# Patient Record
Sex: FEMALE | Race: WHITE | URBAN - METROPOLITAN AREA
[De-identification: names, ages, dates, MRNs, and addresses within clinical notes are randomized per-mention and may not be internally consistent; named-entity substitution may affect disease eponyms.]

---

## 2019-03-05 PROCEDURE — 87077 CULTURE AEROBIC IDENTIFY: CPT | Performed by: EMERGENCY MEDICINE

## 2019-03-05 PROCEDURE — 87070 CULTURE OTHR SPECIMN AEROBIC: CPT | Performed by: EMERGENCY MEDICINE

## 2019-03-05 PROCEDURE — 87205 SMEAR GRAM STAIN: CPT | Performed by: EMERGENCY MEDICINE

## 2021-10-25 ENCOUNTER — HOSPITAL ENCOUNTER (INPATIENT)
Facility: HOSPITAL | Age: 31
LOS: 2 days | Discharge: HOME | DRG: 638 | End: 2021-10-27
Attending: EMERGENCY MEDICINE | Admitting: INTERNAL MEDICINE
Payer: COMMERCIAL

## 2021-10-25 ENCOUNTER — APPOINTMENT (OUTPATIENT)
Dept: GENERAL RADIOLOGY | Facility: HOSPITAL | Age: 31
DRG: 638 | End: 2021-10-25
Attending: EMERGENCY MEDICINE
Payer: COMMERCIAL

## 2021-10-25 DIAGNOSIS — E10.10 DIABETIC KETOACIDOSIS WITHOUT COMA ASSOCIATED WITH TYPE 1 DIABETES MELLITUS (HCC): Primary | ICD-10-CM

## 2021-10-25 DIAGNOSIS — R73.9 HYPERGLYCEMIA: ICD-10-CM

## 2021-10-25 DIAGNOSIS — N17.9 ACUTE KIDNEY INJURY (HCC): ICD-10-CM

## 2021-10-25 DIAGNOSIS — E87.2 METABOLIC ACIDOSIS: ICD-10-CM

## 2021-10-25 PROBLEM — E87.20 METABOLIC ACIDOSIS: Status: ACTIVE | Noted: 2021-10-25

## 2021-10-25 PROBLEM — D72.829 LEUKOCYTOSIS: Status: ACTIVE | Noted: 2021-10-25

## 2021-10-25 PROCEDURE — 3046F HEMOGLOBIN A1C LEVEL >9.0%: CPT | Performed by: NURSE PRACTITIONER

## 2021-10-25 PROCEDURE — 99291 CRITICAL CARE FIRST HOUR: CPT | Performed by: INTERNAL MEDICINE

## 2021-10-25 PROCEDURE — 71045 X-RAY EXAM CHEST 1 VIEW: CPT | Performed by: EMERGENCY MEDICINE

## 2021-10-25 PROCEDURE — 99254 IP/OBS CNSLTJ NEW/EST MOD 60: CPT | Performed by: INTERNAL MEDICINE

## 2021-10-25 RX ORDER — LEVOTHYROXINE SODIUM 0.07 MG/1
75 TABLET ORAL
Status: DISCONTINUED | OUTPATIENT
Start: 2021-10-25 | End: 2021-10-27

## 2021-10-25 RX ORDER — ONDANSETRON 2 MG/ML
4 INJECTION INTRAMUSCULAR; INTRAVENOUS EVERY 4 HOURS PRN
Status: DISCONTINUED | OUTPATIENT
Start: 2021-10-25 | End: 2021-10-25

## 2021-10-25 RX ORDER — MELATONIN
3 NIGHTLY PRN
Status: DISCONTINUED | OUTPATIENT
Start: 2021-10-25 | End: 2021-10-27

## 2021-10-25 RX ORDER — SODIUM CHLORIDE 9 MG/ML
INJECTION, SOLUTION INTRAVENOUS CONTINUOUS
Status: DISCONTINUED | OUTPATIENT
Start: 2021-10-25 | End: 2021-10-25

## 2021-10-25 RX ORDER — SODIUM CHLORIDE 450 MG/100ML
INJECTION, SOLUTION INTRAVENOUS CONTINUOUS
Status: DISCONTINUED | OUTPATIENT
Start: 2021-10-25 | End: 2021-10-25

## 2021-10-25 RX ORDER — ENOXAPARIN SODIUM 100 MG/ML
40 INJECTION SUBCUTANEOUS NIGHTLY
Status: DISCONTINUED | OUTPATIENT
Start: 2021-10-25 | End: 2021-10-25 | Stop reason: DRUGHIGH

## 2021-10-25 RX ORDER — INSULIN ASPART 100 [IU]/ML
INJECTION, SOLUTION INTRAVENOUS; SUBCUTANEOUS CONTINUOUS
COMMUNITY

## 2021-10-25 RX ORDER — HYDROMORPHONE HYDROCHLORIDE 1 MG/ML
0.5 INJECTION, SOLUTION INTRAMUSCULAR; INTRAVENOUS; SUBCUTANEOUS EVERY 30 MIN PRN
Status: ACTIVE | OUTPATIENT
Start: 2021-10-25 | End: 2021-10-25

## 2021-10-25 RX ORDER — ENOXAPARIN SODIUM 100 MG/ML
30 INJECTION SUBCUTANEOUS NIGHTLY
Status: DISCONTINUED | OUTPATIENT
Start: 2021-10-25 | End: 2021-10-25

## 2021-10-25 RX ORDER — DEXTROSE AND SODIUM CHLORIDE 5; .45 G/100ML; G/100ML
INJECTION, SOLUTION INTRAVENOUS CONTINUOUS
Status: DISCONTINUED | OUTPATIENT
Start: 2021-10-25 | End: 2021-10-25

## 2021-10-25 RX ORDER — ACETAMINOPHEN 325 MG/1
650 TABLET ORAL EVERY 6 HOURS PRN
Status: DISCONTINUED | OUTPATIENT
Start: 2021-10-25 | End: 2021-10-27

## 2021-10-25 RX ORDER — DEXTROSE MONOHYDRATE 25 G/50ML
50 INJECTION, SOLUTION INTRAVENOUS
Status: DISCONTINUED | OUTPATIENT
Start: 2021-10-25 | End: 2021-10-25

## 2021-10-25 RX ORDER — INSULIN ASPART 100 [IU]/ML
0.2 INJECTION, SOLUTION INTRAVENOUS; SUBCUTANEOUS ONCE
Status: COMPLETED | OUTPATIENT
Start: 2021-10-25 | End: 2021-10-25

## 2021-10-25 RX ORDER — SODIUM BICARBONATE 150 MEQ/1,000 ML IN DEXTROSE 5 % INTRAVENOUS
125 SOLUTION CONTINUOUS
Status: DISCONTINUED | OUTPATIENT
Start: 2021-10-25 | End: 2021-10-25

## 2021-10-25 RX ORDER — ENOXAPARIN SODIUM 100 MG/ML
40 INJECTION SUBCUTANEOUS DAILY
Status: DISCONTINUED | OUTPATIENT
Start: 2021-10-26 | End: 2021-10-27

## 2021-10-25 RX ORDER — PROCHLORPERAZINE EDISYLATE 5 MG/ML
5 INJECTION INTRAMUSCULAR; INTRAVENOUS EVERY 8 HOURS PRN
Status: DISCONTINUED | OUTPATIENT
Start: 2021-10-25 | End: 2021-10-27

## 2021-10-25 RX ORDER — DEXTROSE MONOHYDRATE 25 G/50ML
50 INJECTION, SOLUTION INTRAVENOUS
Status: DISCONTINUED | OUTPATIENT
Start: 2021-10-25 | End: 2021-10-27

## 2021-10-25 RX ORDER — LEVOTHYROXINE SODIUM 0.07 MG/1
75 TABLET ORAL
COMMUNITY

## 2021-10-25 RX ORDER — ONDANSETRON 2 MG/ML
4 INJECTION INTRAMUSCULAR; INTRAVENOUS EVERY 6 HOURS PRN
Status: DISCONTINUED | OUTPATIENT
Start: 2021-10-25 | End: 2021-10-27

## 2021-10-25 NOTE — ED INITIAL ASSESSMENT (HPI)
Patient here with c/o emesis since Saturday, patient recently got back from [place] Saturday. Patient vomited 2 times this AM and started feeling SOB PTA. Patient is type 1 DM, reports she cant get her sugar under control.

## 2021-10-25 NOTE — ED PROVIDER NOTES
Patient Seen in: [facility] Emergency Department      History   Patient presents with:  Nausea/Vomiting/Diarrhea  Difficulty Breathing    Stated Complaint: pt states she has been vomiting for 4 hours with sob and left upper quad pain, *    Jay with kusmal breathing noted. She is toxic appearing. Somewhat sleepy. HEENT: Pupils are equal reactive to light. Extra ocular motions are intact. No scleral icterus or conjunctival pallor: Neck is supple without tenderness on palpation.   Head is atrau Please view results for these tests on the individual orders.    URINALYSIS WITH CULTURE REFLEX   BETA HYDROXYBUTYRATE   RAINBOW DRAW LAVENDER   RAINBOW DRAW LIGHT GREEN   RAINBOW DRAW GOLD     EKG    Rate, intervals and axes as noted on EKG Repor admission because of her diabetic ketoacidosis. She will be given IV fluids as well as insulin.   A total of 30 minutes of critical care time (exclusive of billable procedures) was administered to manage the patient's metabolic instability due to her diabe

## 2021-10-25 NOTE — CONSULTS
[facility]  Report of Consultation    Casandra Hdz Patient Status:  Inpatient    10/14/1990 MRN [id number]   [facility] 4SW-A Attending Padmini Riggs MD   Hosp Day # 0 PCP Trinity Retana MD       Assessment / Plan:    1) FAROOQ on CKD- Intravenous, Continuous  •  levothyroxine tab 75 mcg, 75 mcg, Oral, Before breakfast  •  glucose (DEX4) oral liquid 15 g, 15 g, Oral, Q15 Min PRN **OR** glucose-vitamin C (DEX-4) chewable tab 4 tablet, 4 tablet, Oral, Q15 Min PRN **OR** dextrose 50 % injec Abdomen: Soft, non-tender. + bowel sounds, no palpable organomegaly  Extremities: Without clubbing, cyanosis; no edema  Neurologic: Cranial nerves grossly intact, moving all extremities  Skin: Warm and dry, no rashes      Laboratory Data:  Lab Results

## 2021-10-25 NOTE — CONSULTS
[facility] Note  [facility]  Report of Consultation    Lexx Schmitt Patient Status:  Emergency    10/14/1990 MRN [id number]   Location [address] [place] [address] Attending Deronda Fabry, MD fatigue, orthopnea, paroxysmal nocturnal dyspnea, lower extremity edema. Gastrointestinal: nausea and vomiting and diarrhea. Integument/breast: Negative for rash, skin lesions, and pruritus.   Hematologic/lymphatic: Negative for easy bruising, bleeding, a 4.99   HGB 13.5   HCT 45.8   MCV 91.8   MCH 27.1   MCHC 29.5*   RDW 12.7   NEPRELIM 20.73*   WBC 24.3*   .0*     No results for input(s): BNP in the last 168 hours.   Recent Labs   Lab 10/25/21  0745   TROP <0.045     No results for input(s): PT, INR

## 2021-10-25 NOTE — PLAN OF CARE
Received report from ED RN. Pt arrived to unit via stretcher with IVFs infusing at 125 ml/hr. No s/s of acute distress noted at this time. Pts HR noted to be sustained in the 140's, APN and MD notified, and a 1 L NS bolus hung for infusion.

## 2021-10-25 NOTE — H&P
[facility] HOSPITALIST                                                               History & Physical         Yovani Mcrae Patient Status:  Inpatient    10/14/1990 MRN [id number]   [facility] 4SW-A Attending Emerita Le MD   [facility] Day # 0 PC Medical History:   Diagnosis Date   • Diabetic retinopathy ([facility]    • Hypothyroidism    • Thyroid disease    • Type 1 diabetes mellitus ([facility]        Past Surgical History:   Procedure Laterality Date   • CATARACT         Family history:  Family History   P Component Value Date    WBC 24.3 10/25/2021    HGB 13.5 10/25/2021    HCT 45.8 10/25/2021    .0 10/25/2021    CREATSERUM 2.72 10/25/2021    BUN 42 10/25/2021     10/25/2021    K 4.8 10/25/2021     10/25/2021    CO2 5.0 10/25/2021    GL normal saline once blood sugar less than 120 if anion gap persist at that time. 3. Acute kidney injury due to intractable nausea vomiting due to DKA  1. IV fluids  2. Nephrology consult  4.  Leukocytosis and thrombocytosis due to stress-induced leukocytosi

## 2021-10-25 NOTE — CONSULTS
[facility] Pharmacy Note:  Renal Dose Adjustment for enoxaparin (LOVENOX)    Jazmin Chacko has been prescribed enoxaparin 40 mg subcutaneously every 24 hours. Estimated Creatinine Clearance: 28.1 mL/min (A) (based on SCr of 2.72 mg/dL (H)).     Calculated CrCl 20

## 2021-10-26 PROCEDURE — 99232 SBSQ HOSP IP/OBS MODERATE 35: CPT | Performed by: INTERNAL MEDICINE

## 2021-10-26 PROCEDURE — 99291 CRITICAL CARE FIRST HOUR: CPT | Performed by: INTERNAL MEDICINE

## 2021-10-26 RX ORDER — POTASSIUM CHLORIDE 20 MEQ/1
40 TABLET, EXTENDED RELEASE ORAL ONCE
Status: DISCONTINUED | OUTPATIENT
Start: 2021-10-26 | End: 2021-10-26

## 2021-10-26 RX ORDER — DEXTROSE AND POTASSIUM CHLORIDE 5; .15 G/100ML; G/100ML
SOLUTION INTRAVENOUS CONTINUOUS
Status: DISCONTINUED | OUTPATIENT
Start: 2021-10-26 | End: 2021-10-27

## 2021-10-26 RX ORDER — POTASSIUM CHLORIDE 1.5 G/1.77G
40 POWDER, FOR SOLUTION ORAL ONCE
Status: COMPLETED | OUTPATIENT
Start: 2021-10-26 | End: 2021-10-26

## 2021-10-26 NOTE — PROGRESS NOTES
Pharmacy Note: Renal dose adjustment   Preston Dillon was originally prescribed enoxaparin 40 mg subcutaneously every 24 hours which was renally dose adjusted at the time of the original order per P&T approved renal dosing protocol.   Renal function has now

## 2021-10-26 NOTE — PLAN OF CARE
Assumed pt care at 0730. Pt in bed resting quietly on room air. No s/s of acute distress noted at this time. VSS. Pt has an insulin gtt infusing for DKA, with hourly accuchecks. Spouse at the bedside. Pt denies any pain at this time. A&Ox4.  Tra

## 2021-10-26 NOTE — PROGRESS NOTES
[facility] Pulmonary/Critical Care Progress Note     SUBJECTIVE/Interval history: All events, procedures, notes reviewed. pt denies cp or sob or abdominal pain or n/v      Review of Systems:   A comprehensive 14 point re GFRNAA 39* 42*  42* 43*   CA 8.8 9.0  9.0 8.9   * 157*  157* 157*   K 3.9 3.8  3.8 3.8   * 133*  133* 133*   CO2 14.0* 15.0*  15.0* 15.0*     Recent Labs   Lab 10/25/21  0745 10/26/21  0504   RBC 4.99 4.11   HGB 13.5 10.8*   HCT 45.8 34.5* · continue iv insulin ggt for DKA. · nagma now. May start clear liquids and advance as tolerated.  May give subcut insulin and stop insulin ggt an hour later   · Remove insulin pump  · Proph: hep subcut  · D/w rn/apn  · cctime 35 min  · Dispo:ICU for dk

## 2021-10-26 NOTE — PAYOR COMM NOTE
--------------  ADMISSION REVIEW     Payor: Sai WOODARD #:  [id number]  Authorization Number: [id number]    Admit date: 10/25/21  Admit time: 10:11 AM       REVIEW DOCUMENTATION:     ED Provider Notes      ED Provider Notes signed by Jemma Zuleta negative except as noted above.     Physical Exam     ED Triage Vitals [10/25/21 0711]   /75   Pulse 61   Resp (!) 30   Temp 97.3 °F (36.3 °C)   Temp src Temporal   SpO2 97 %   O2 Device        Current:/82   Pulse (!) 144   Temp 97.3 °F (36.3 °C limits   TROPONIN I - Normal   RAPID SARS-COV-2 BY PCR - Normal   CBC WITH DIFFERENTIAL WITH PLATELET    Narrative: The following orders were created for panel order CBC With Differential With Platelet.   Procedure                               Abnormal 5 anion gap of 25 BUN of 42 creatinine of 2.7. Calcium of 10.3 total protein 9.7 globulin 5.1. Troponin was normal.  Chest x-ray did not show any evidence of any infiltrative process to assess for pneumonia.   Admission disposition: 10/25/2021  9:16 AM Laurence David MD (Physician)         [facility] [place] [facility]                                                               History & Physical         Mya Yale Patient Status:  Inpatient    10/14/1990 MRN [id number]   [facility] 4SW-A Attendi physician on consult.     History:  Past Medical History:   Diagnosis Date   • Diabetic retinopathy ([facility]    • Hypothyroidism    • Thyroid disease    • Type 1 diabetes mellitus ([facility]        Past Surgical History:   Procedure Laterality Date   • CATARACT Results   Component Value Date    WBC 24.3 10/25/2021    HGB 13.5 10/25/2021    HCT 45.8 10/25/2021    .0 10/25/2021    CREATSERUM 2.72 10/25/2021    BUN 42 10/25/2021     10/25/2021    K 4.8 10/25/2021     10/25/2021    CO2 5.0 10/25/20 D5 normal saline once blood sugar less than 120 if anion gap persist at that time. 3. Acute kidney injury due to intractable nausea vomiting due to DKA  1. IV fluids  2. Nephrology consult  4.  Leukocytosis and thrombocytosis due to stress-induced leukocyt Dextrose-NaCl 5-0.45 % 1,000 mL with potassium phosphate dibasic 20 mmol infusion     Date Action Dose Route User    10/26/2021 0454 New Bag (none) Intravenous Julieth Peñaloza RN    10/25/2021 0133 Rate/Dose Change (none) Intravenous Nora Tripp, — 109 16 102/72 100 % — — — LZ    10/26/21 0000 99.7 °F (37.6 °C) 103 16 114/72 100 % — None (Room air) —     10/25/21 2300 — 117 17 117/68 100 % — — —     10/25/21 2200 — 123 22 — 100 % — — —     10/25/21 2100 — 113 14 113/59 100 % — — —     10/25 PLAN- fluid resuscitation / mgmt of DKA; expect recovery. Obtain records from [facility]     2) DKA- may be related to acute illness + recent changes in insulin pump; now presents with severe AGMA due to ketosis.  PLAN- continue IVF / insulin gt; follow lytes (

## 2021-10-26 NOTE — PROGRESS NOTES
[facility]  Progress Note    Preston Dillon Patient Status:  Inpatient    10/14/1990 MRN [id number]   [facility] 4SW-A Attending Jose Avendaño MD   Hosp Day # 1 PCP Judy Escamilla MD     CC: Abdominal pain, nausea vomiting, DKA    SUBJ non-tender; bowel sounds normal  Extremities: extremities normal,no cyanosis or edema  Pulses: 2+ and symmetric  Skin: Skin color, texture, turgor normal. No rashes or lesions  Neurologic: Awake,alert,nonfocal  Psych: Mood and affect appears normal      La tab 3 mg, 3 mg, Oral, Nightly PRN  ondansetron (ZOFRAN) injection 4 mg, 4 mg, Intravenous, Q6H PRN  Prochlorperazine Edisylate (COMPAZINE) injection 5 mg, 5 mg, Intravenous, Q8H PRN  enoxaparin (LOVENOX) 40 MG/0.4ML injection 40 mg, 40 mg, Subcutaneous, Da nephrology  ·  Repeat BMP shows improving sodium and chloride levels  ·  Bicarb improving        Quality:  · DVT Prophylaxis: SCD, subcutaneous Lovenox  · CODE status: Full  · Harmon: No     Plan of care :  Insulin drip being switched to subcutaneous Levemir

## 2021-10-26 NOTE — PROGRESS NOTES
[facility]  Nephrology Progress Note    Marcelle Gonzalez Attending:  Sampson Disla MD       Assessment and Plan:    1) FAROOQ on CKD- baseline renal function unknown but given h/o proteinuria + retinopathy and 20+ yr h/o type 1 DM- likely has underlying diab 3.8 10/26/2021     10/26/2021    CO2 15.0 10/26/2021     10/26/2021    CA 8.9 10/26/2021    ALB 3.2 10/26/2021    ALKPHO 59 10/26/2021    BILT 0.4 10/26/2021    TP 6.9 10/26/2021    AST 11 10/26/2021    ALT 18 10/26/2021    TSH 0.396 10/25/202

## 2021-10-27 VITALS
SYSTOLIC BLOOD PRESSURE: 138 MMHG | RESPIRATION RATE: 15 BRPM | OXYGEN SATURATION: 100 % | WEIGHT: 153 LBS | HEIGHT: 66 IN | HEART RATE: 85 BPM | BODY MASS INDEX: 24.59 KG/M2 | DIASTOLIC BLOOD PRESSURE: 85 MMHG | TEMPERATURE: 99 F

## 2021-10-27 PROCEDURE — 3061F NEG MICROALBUMINURIA REV: CPT | Performed by: NURSE PRACTITIONER

## 2021-10-27 PROCEDURE — 99239 HOSP IP/OBS DSCHRG MGMT >30: CPT | Performed by: INTERNAL MEDICINE

## 2021-10-27 PROCEDURE — 3060F POS MICROALBUMINURIA REV: CPT | Performed by: NURSE PRACTITIONER

## 2021-10-27 PROCEDURE — 99233 SBSQ HOSP IP/OBS HIGH 50: CPT | Performed by: INTERNAL MEDICINE

## 2021-10-27 NOTE — DISCHARGE SUMMARY
[facility]  DISCHARGE SUMMARY     Milton Gardiner Patient Status:  Inpatient    10/14/1990 MRN [id number]   [facility] 4SW-A Attending No att. providers found   Hosp Day # 2 PCP Rachele Vogel DO     Date of Admission: 10/25/2021  Date being given from the ER and admitted to ICU with pulmonary critical care physician on consult. Brief Synopsis: presented with n/v and found to be in DKA with FAROOQ. Admitted to ICU on insulin drip and IVF. Pulm/CC and nephrology consulted.  She was treated 1/INITIAL  [1918] 60 min Jessy, [address], [place] Blanquita Briones, [place], CDE    Patient Instructions:     WHO TO CONTACT TO CHANGE APPOINTMENT   If you need to make changes to your appointment or have questions,  please ca focal neurological deficits. Musculoskeletal: Moves all extremities. Extremities: No edema.   -----------------------------------------------------------------------------------------------  PATIENT DISCHARGE INSTRUCTIONS: See electronic chart    Marychuy Hurd

## 2021-10-27 NOTE — PLAN OF CARE
PIV removed. Pt deferred flu vaccine at this time. Education provided. Reviewed discharge papers with pt and spouse. Questions answered. Pt accompanied down to lobby with spouse. See MAR and flowsheets for additional information.

## 2021-10-27 NOTE — PLAN OF CARE
Assumed care of pt after shift report. Received pt resting in bed. Alert and oriented x4. Room air. NSR. Independent after set-up. Up to bathroom, adequate urine output. Denies pain.  Education for insulin administration via insulin pens provided this AM. P

## 2021-10-27 NOTE — PLAN OF CARE
Received pt at change of shift with  at bedside. Drowsy. VSS. T-99.6.  up to bathroom to void. On room air. Denies pain. Occasionally c/o nausea when anxious. Accu-245 at 2050.   Pt and  reluctant to take novolog correction (2 units) be

## 2021-10-27 NOTE — PROGRESS NOTES
[facility]  Nephrology Progress Note    Eva Pulse Attending:  Geno Gupta MD       Assessment and Plan:    1) FAROOQ on CKD- baseline renal function unknown but given h/o proteinuria + retinopathy and 20+ yr h/o type 1 DM- likely has underlying diab  10/27/2021    CA 8.5 10/27/2021    PHOS 3.0 10/27/2021    PGLU 290 10/27/2021       Imaging: All imaging studies reviewed.     Meds:   dextrose 5 % with KCl 20 mEq infusion, , Intravenous, Continuous  insulin detemir (LEVEMIR) 100 UNIT/ML flexto

## 2021-10-27 NOTE — PAYOR COMM NOTE
--------------  CONTINUED STAY REVIEW----REQUESTING ADDITIONAL DAY 10/27      Payor: [facility]  Subscriber #:  [id number]  Authorization Number: [id number]    Admit date: 10/25/21  Admit time: 10:11 AM    Admitting Physician: Bernabe Lozada MD  Attending deformity. Heart:rrr  Abdomen:Soft, non-tender. No masses. No guarding. No rebound. Extremities:nt no edema           Skin:No rashes or lesions.   Lymph nodes:Not examined              Lab Data Review:         Recent Labs   Lab 10/26/21  0504 10/26/21 Subcutaneous TID CC   • Insulin Aspart Pen  1-50 Units Subcutaneous TID CC and HS   • levothyroxine  75 mcg Oral Before breakfast   • enoxaparin  40 mg Subcutaneous Daily      influenza virus vaccine PF, glucose **OR** glucose-vitamin C **OR** dextrose **O Oral PerinRafael cook RN      dextrose 5 % with KCl 20 mEq infusion     Date Action Dose Route User    10/27/2021 0039 New Bag (none) Intravenous Shaunna Mahoney RN    10/26/2021 2307 Rate/Dose Change (none) Intravenous Shaunna Mahoney RN    10/

## 2021-10-27 NOTE — PROGRESS NOTES
[facility] Pulmonary/Critical Care Progress Note     SUBJECTIVE/Interval history: All events, procedures, notes reviewed. pt feels well. Denies cp or sob. Feels well and tolerating po.        Review of Systems:   BEN ansari GFRNAA 43* 46* 53*   CA 8.9 8.2* 8.5   * 144 139   K 3.8 3.2* 3.8   * 119* 113*   CO2 15.0* 17.0* 21.0     Recent Labs   Lab 10/25/21  0745 10/26/21  0504 10/27/21  0452   RBC 4.99 4.11 3.88   HGB 13.5 10.8* 10.2*   HCT 45.8 34.5* 31.8*   MCV Edisylate    ASSESSMENT         · DKA  · Dehydration with intravascular depletion due to above  · FAROOQ due to above  · Severe hypernatremia  · Anion gap and NAGMA  · Thrombocytosis likely reactive      PLAN      · UA and lipase negative.  Cultures negative

## 2021-10-28 ENCOUNTER — PATIENT OUTREACH (OUTPATIENT)
Dept: CASE MANAGEMENT | Age: 31
End: 2021-10-28

## 2021-10-28 DIAGNOSIS — Z02.9 ENCOUNTERS FOR UNSPECIFIED ADMINISTRATIVE PURPOSE: ICD-10-CM

## 2021-10-28 DIAGNOSIS — E10.10 DIABETIC KETOACIDOSIS WITHOUT COMA ASSOCIATED WITH TYPE 1 DIABETES MELLITUS (HCC): ICD-10-CM

## 2021-10-28 RX ORDER — BLOOD-GLUCOSE TRANSMITTER
EACH MISCELLANEOUS
COMMUNITY
Start: 2021-10-12 | End: 2021-10-29 | Stop reason: CLARIF

## 2021-10-28 RX ORDER — BLOOD-GLUCOSE SENSOR
EACH MISCELLANEOUS
COMMUNITY
Start: 2021-09-11

## 2021-10-28 NOTE — PROGRESS NOTES
Initial Post Discharge Follow Up   Discharge Date: 10/27/21  Contact Date: 10/28/2021    Consent Verification:  Assessment Completed With: Patient  HIPAA Verified?   Yes    Discharge Dx:  Diabetic ketoacidosis without coma associated with type 1 diabetes Continuous Blood Gluc Transmit (DEXCOM G6 TRANSMITTER) Does not apply Misc      • Continuous Blood Gluc Sensor (DEXCOM G6 SENSOR) Does not apply Misc        • (NCM)  Were there any medication changes noted on AVS?  no  • May I go over your medications with some muscle aches she states from being in the hospital and likely all the vomiting. Patient was educated on symptoms of DKA and if she should experience similar symptoms to what brought her in to the hospital, she is to return to the ER or call 911.   Jannette

## 2021-10-29 ENCOUNTER — OFFICE VISIT (OUTPATIENT)
Dept: INTERNAL MEDICINE CLINIC | Facility: CLINIC | Age: 31
End: 2021-10-29
Payer: COMMERCIAL

## 2021-10-29 VITALS
SYSTOLIC BLOOD PRESSURE: 156 MMHG | RESPIRATION RATE: 18 BRPM | HEIGHT: 66 IN | WEIGHT: 152 LBS | OXYGEN SATURATION: 100 % | TEMPERATURE: 98 F | HEART RATE: 73 BPM | BODY MASS INDEX: 24.43 KG/M2 | DIASTOLIC BLOOD PRESSURE: 74 MMHG

## 2021-10-29 DIAGNOSIS — F41.9 ANXIETY: ICD-10-CM

## 2021-10-29 DIAGNOSIS — E10.65 UNCONTROLLED TYPE 1 DIABETES MELLITUS WITH HYPERGLYCEMIA (HCC): Primary | ICD-10-CM

## 2021-10-29 DIAGNOSIS — E10.10 DIABETIC KETOACIDOSIS WITHOUT COMA ASSOCIATED WITH TYPE 1 DIABETES MELLITUS (HCC): ICD-10-CM

## 2021-10-29 PROCEDURE — 3077F SYST BP >= 140 MM HG: CPT | Performed by: NURSE PRACTITIONER

## 2021-10-29 PROCEDURE — 99495 TRANSJ CARE MGMT MOD F2F 14D: CPT | Performed by: NURSE PRACTITIONER

## 2021-10-29 PROCEDURE — 3078F DIAST BP <80 MM HG: CPT | Performed by: NURSE PRACTITIONER

## 2021-10-29 PROCEDURE — 3008F BODY MASS INDEX DOCD: CPT | Performed by: NURSE PRACTITIONER

## 2021-10-29 RX ORDER — ACETAMINOPHEN/PYRILAMINE/CAFF 500-15-60
1 TABLET ORAL AS NEEDED
COMMUNITY

## 2021-10-29 RX ORDER — IBUPROFEN 200 MG
400 TABLET ORAL EVERY 6 HOURS PRN
COMMUNITY

## 2021-10-29 RX ORDER — MELATONIN
1000 DAILY
COMMUNITY

## 2021-10-29 NOTE — PROGRESS NOTES
[address] TRANSITIONAL CARE CLINIC      HISTORY   CHIEF COMPLAINT: HFU-Uncontrolled DMI, DKA, and anxiety. HPI: Milton Gardiner is a 32year old female here today for hospital follow up for uncontrolled DMI, DKA, and anxiety.   Milton Gardiner the Diabetes center to help control her BS. She was seeing a endocrinologist at [facility] and would like someone closer now as well as a PCP closer.  She reports her BS this am was 264 and she has been giving herself closer to what her endocrinologist had recomme 274 (H) 10/27/2021 04:52 AM     10/27/2021 04:52 AM    K 3.8 10/27/2021 04:52 AM     (H) 10/27/2021 04:52 AM    CO2 21.0 10/27/2021 04:52 AM    BUN 19 (H) 10/27/2021 04:52 AM    CREATSERUM 1.34 (H) 10/27/2021 04:52 AM    CA 8.5 10/27/2021 04:52 ([facility]  · Uncontrolled with A1C in hospital of 12.3%  · Continue to monitor BS with CGM and insulin pump at least 3-4 times daily  · BS this am was 264  · Continue insulin pump per endocrinology recommendations  · OP REFERRAL PROVIDER VISIT-DIABETES-APN  · needed for Pain., Disp: , Rfl:   Continuous Blood Gluc Sensor (DEXCOM G6 SENSOR) Does not apply Misc, , Disp: , Rfl:   insulin aspart 100 UNIT/ML Subcutaneous Solution, Inject into the skin continuous.  Uses Omnipod insulin pump, Disp: , Rfl:   levothyroxin discharge to 30 days:   · Number of Possible Diagnoses and/or Management Options: moderate  · Amount and/or Complexity of Data to Be Reviewed: moderate  · Risk of Significant Complications, Morbidity, and/or Mortality: moderate    Overall Risk:   moderate

## 2021-10-29 NOTE — PROGRESS NOTES
[facility] PHARMACIST MEDICATION RECONCILIATION        Lexx Schmitt MRN [id number]    10/14/1990 PCP Trupti Mane MD       Comments: Medication history completed by the [facility] Pharmacist with the patient and  (S side effects of medications. Patient confirmed understanding.      Thank you,    Mariann Amor, PharmD, 10/29/2021, 10:28 AM  [facility]

## 2021-11-02 ENCOUNTER — OFFICE VISIT (OUTPATIENT)
Dept: ENDOCRINOLOGY CLINIC | Facility: CLINIC | Age: 31
End: 2021-11-02
Payer: COMMERCIAL

## 2021-11-02 VITALS
SYSTOLIC BLOOD PRESSURE: 122 MMHG | HEIGHT: 66 IN | BODY MASS INDEX: 25.23 KG/M2 | DIASTOLIC BLOOD PRESSURE: 80 MMHG | OXYGEN SATURATION: 99 % | HEART RATE: 80 BPM | WEIGHT: 157 LBS | RESPIRATION RATE: 20 BRPM

## 2021-11-02 DIAGNOSIS — E10.65 TYPE 1 DIABETES MELLITUS WITH HYPERGLYCEMIA (HCC): ICD-10-CM

## 2021-11-02 DIAGNOSIS — Z96.41 INSULIN PUMP IN PLACE: Primary | ICD-10-CM

## 2021-11-02 DIAGNOSIS — R80.9 MICROALBUMINURIA DUE TO TYPE 1 DIABETES MELLITUS (HCC): ICD-10-CM

## 2021-11-02 DIAGNOSIS — E10.29 MICROALBUMINURIA DUE TO TYPE 1 DIABETES MELLITUS (HCC): ICD-10-CM

## 2021-11-02 PROBLEM — N17.9 ACUTE KIDNEY INJURY: Status: RESOLVED | Noted: 2021-10-25 | Resolved: 2021-11-02

## 2021-11-02 PROBLEM — N17.9 ACUTE KIDNEY INJURY (HCC): Status: RESOLVED | Noted: 2021-10-25 | Resolved: 2021-11-02

## 2021-11-02 PROCEDURE — 3008F BODY MASS INDEX DOCD: CPT | Performed by: NURSE PRACTITIONER

## 2021-11-02 PROCEDURE — 95251 CONT GLUC MNTR ANALYSIS I&R: CPT | Performed by: NURSE PRACTITIONER

## 2021-11-02 PROCEDURE — 99215 OFFICE O/P EST HI 40 MIN: CPT | Performed by: NURSE PRACTITIONER

## 2021-11-02 PROCEDURE — 3074F SYST BP LT 130 MM HG: CPT | Performed by: NURSE PRACTITIONER

## 2021-11-02 PROCEDURE — 3079F DIAST BP 80-89 MM HG: CPT | Performed by: NURSE PRACTITIONER

## 2021-11-02 RX ORDER — INSULIN ASPART INJECTION 100 [IU]/ML
1 INJECTION, SOLUTION SUBCUTANEOUS AS DIRECTED
Qty: 1 EACH | Refills: 0 | Status: CANCELLED | COMMUNITY
Start: 2021-11-02

## 2021-11-02 RX ORDER — INSULIN ASPART INJECTION 100 [IU]/ML
INJECTION, SOLUTION SUBCUTANEOUS
Qty: 10 ML | Refills: 0 | COMMUNITY
Start: 2021-11-02

## 2021-11-02 NOTE — ASSESSMENT & PLAN NOTE
A1C 12.3%   Weight: 157 lb lb   Diabetes control is poorly controlled. Had lengthy discussion regarding poor glycemic control. Patient was reminded  the negative impact these persistent high glucose trends have on her health.    Discussed importance of b

## 2021-11-02 NOTE — PATIENT INSTRUCTIONS
We are here to help you manage your diabetes.  Please continue with your primary care physician/provider for your routine health care maintenance     Your A1C: 12.3%  This is too high for you  Please talk w your endocrinologist ~ he may want to keep your ca hungry  · Headache  · Nervousness  · A hard, fast heartbeat  · Weakness  · Confusion or irritability  · Blurred eyesight  · Having nightmares or waking up confused or sweating  · Numbness or tingling in the lips or tongue    Treatment of Low Blood sugar Ac Backordered), it is your responsibility to find another pharmacy that has the requested medication available.   We will gladly send a new prescription to that pharmacy at your request.     Thank you   Cherelle Lozada   [phone]

## 2021-11-02 NOTE — PROGRESS NOTES
Kar Wilson is a 32year old female who presents today w  to establish for type 1 diabetes management. Primary care physician: Heather Crisostomo DO  In the past 3m DM control has not been well controlled.  Most recent A1C  12.3%     Was recently hospi diabetes   Patient has had hospitalizations for blood sugar issues  denies any history of pancreatitis      Previous DM therapies:  MDI   omni pod x 10 years      Current DM Regimen:  Dexcom CGM G6   Omni pod   Basal rate:   12MN 0.5   Bolus settings:   IC 400 mg by mouth every 6 (six) hours as needed for Pain. • Continuous Blood Gluc Sensor (DEXCOM G6 SENSOR) Does not apply Misc      • insulin aspart 100 UNIT/ML Subcutaneous Solution Inject into the skin continuous.  Uses Omnipod insulin pump     • levot today so she is more comfortable w bolusing for BG trends and carbs.    Discussed trend arrows on dexcom and how if active insulin still on board, it is common to see trend arrow down     Basal rates:   12MN 0.5  6am 0.5 --> 0.4   10 pm : 0.5     Bolus sett discussed with the patient today. questions were also answered to the best of my knowledge.

## 2021-11-03 PROBLEM — F41.9 ANXIETY: Status: ACTIVE | Noted: 2021-11-03

## 2021-11-03 PROBLEM — E10.65 UNCONTROLLED TYPE 1 DIABETES MELLITUS WITH HYPERGLYCEMIA (HCC): Status: ACTIVE | Noted: 2021-11-02

## 2021-11-08 NOTE — PROGRESS NOTES
Chief Complaint:  Patient presents with:  Establish Care: [facility] F/U: Pt was seen on 10/25, discharged on 10/27 for DKA    HPI:  This is a 32year old female patient presenting for [address] ([facility] and [facility] F/U (Pt was seen on 10/25, d on birth control before. Notes some B/L ankle swelling last week once she returned to work. Does sit at a desk for 8-9 hours a day. Denies pain, SOB, orthopnea, PND. This has since resolved.      Health Maintenance:  Annual Physical Never done  LDL Cont 100 UNIT/ML Subcutaneous Solution As directed via pump 10 mL 0   • Vitamin D3, Cholecalciferol, 25 MCG (1000 UT) Oral Tab Take 1,000 Units by mouth daily.      • Acetaminophen-Caff-Pyrilamine (MIDOL MAX ST MENSTRUAL) 500-60-15 MG Oral Tab Take 1 tablet by m glargine 100 UNIT/ML Subcutaneous Solution Pen-injector      Other Visit Diagnoses     AGUS (generalized anxiety disorder)    -  Primary    Relevant Medications    escitalopram 5 MG Oral Tab    Other Relevant Orders    OP REFERRAL TO [facility]    Encounter f

## 2021-11-10 ENCOUNTER — NURSE ONLY (OUTPATIENT)
Dept: ENDOCRINOLOGY CLINIC | Facility: CLINIC | Age: 31
End: 2021-11-10
Payer: COMMERCIAL

## 2021-11-10 VITALS — WEIGHT: 153 LBS | BODY MASS INDEX: 25 KG/M2

## 2021-11-10 DIAGNOSIS — E10.65 UNCONTROLLED TYPE 1 DIABETES MELLITUS WITH HYPERGLYCEMIA (HCC): ICD-10-CM

## 2021-11-10 PROCEDURE — G0108 DIAB MANAGE TRN  PER INDIV: HCPCS | Performed by: DIETITIAN, REGISTERED

## 2021-11-11 NOTE — PROGRESS NOTES
Brunilda Guthrie  : 10/14/1990 attended Step 1 Diabetic Education:  Pt.  Accompanied by  to visit  Date: 11/10/2021  Referring Provider: ELIEZER Moreno  Start time: 4:30pm End time: 5:30pm    Wt 153 lb   LMP 10/30/2021   BMI 24.69 kg/m²     HgbA1C (%) regarding  hypoglycemia episodes. Had a relative who also had Type 1 but she has passed away. Now feels she has no one to talk to about her struggles . Has been referred to a therapist but hs not found one.  Pt. provided w/name of Maira Reza

## 2021-11-13 PROBLEM — E10.9 TYPE 1 DIABETES MELLITUS (HCC): Status: ACTIVE | Noted: 2017-08-14

## 2021-11-13 PROBLEM — R80.9 MICROALBUMINURIA: Status: ACTIVE | Noted: 2020-10-21

## 2021-11-13 PROBLEM — E55.9 HYPOVITAMINOSIS D: Status: ACTIVE | Noted: 2021-03-11

## 2021-11-13 PROBLEM — E03.9 HYPOTHYROIDISM: Status: ACTIVE | Noted: 2017-08-14

## 2022-03-29 ENCOUNTER — TELEPHONE (OUTPATIENT)
Dept: ENDOCRINOLOGY CLINIC | Facility: CLINIC | Age: 32
End: 2022-03-29

## 2022-03-30 NOTE — TELEPHONE ENCOUNTER
Last A1c value was 12.3% done 10/25/2021. Last office visit (first and only) with me   No follow  Appointments scheduled. Office note indicated pt was going to return to [facility] however no updates in [facility]  care everywhere    Please contact pt and remind her importance of diabetes follow up   If she prefers endocrinology, can refer to Dr Keith Wilcox.  [facility]
Noted
Patient states she has reestablished care and is continuing to see Dr. Mansi Fleming for DM management.
No

## 2022-05-17 ENCOUNTER — PATIENT OUTREACH (OUTPATIENT)
Dept: FAMILY MEDICINE CLINIC | Facility: CLINIC | Age: 32
End: 2022-05-17

## 2023-04-27 ENCOUNTER — TELEPHONE (OUTPATIENT)
Dept: FAMILY MEDICINE CLINIC | Facility: CLINIC | Age: 33
End: 2023-04-27

## 2023-04-27 NOTE — TELEPHONE ENCOUNTER
Please determine if still pt here. If so, please help schedule for physical with pap smear.      Thanks,  Ambrosio Soto

## 2023-04-27 NOTE — TELEPHONE ENCOUNTER
Call pt and she don't answer voice mail is full and can't  accept any more massage at this time. If pt call please update information and schedule a physical with pap.

## 2024-01-05 LAB
AMB EXT CREATININE, URINE: 0.3 MG/DL
AMB EXT CREATININE: 0.89 MG/DL
AMB EXT EGFR NON-AA: 88
AMB EXT MALB URINE CALC: 80 MG/24HR
AMB EXT MALB/CRE CALC: 4 UG/MG

## 2024-01-11 LAB — AMB EXT HGBA1C: 9.3 %

## 2024-02-29 ENCOUNTER — OFFICE VISIT (OUTPATIENT)
Dept: FAMILY MEDICINE CLINIC | Facility: CLINIC | Age: 34
End: 2024-02-29
Payer: COMMERCIAL

## 2024-02-29 VITALS
BODY MASS INDEX: 26.35 KG/M2 | DIASTOLIC BLOOD PRESSURE: 70 MMHG | TEMPERATURE: 97 F | HEIGHT: 65.7 IN | RESPIRATION RATE: 16 BRPM | HEART RATE: 84 BPM | WEIGHT: 162 LBS | OXYGEN SATURATION: 99 % | SYSTOLIC BLOOD PRESSURE: 120 MMHG

## 2024-02-29 DIAGNOSIS — R10.31 RLQ ABDOMINAL PAIN: Primary | ICD-10-CM

## 2024-02-29 DIAGNOSIS — K59.09 CHRONIC CONSTIPATION: ICD-10-CM

## 2024-02-29 DIAGNOSIS — E10.65 UNCONTROLLED TYPE 1 DIABETES MELLITUS WITH HYPERGLYCEMIA (HCC): ICD-10-CM

## 2024-02-29 PROCEDURE — 99214 OFFICE O/P EST MOD 30 MIN: CPT | Performed by: PHYSICIAN ASSISTANT

## 2024-02-29 PROCEDURE — 3008F BODY MASS INDEX DOCD: CPT | Performed by: PHYSICIAN ASSISTANT

## 2024-02-29 PROCEDURE — 3074F SYST BP LT 130 MM HG: CPT | Performed by: PHYSICIAN ASSISTANT

## 2024-02-29 PROCEDURE — 3078F DIAST BP <80 MM HG: CPT | Performed by: PHYSICIAN ASSISTANT

## 2024-02-29 RX ORDER — INSULIN PMP CART,AUT,G6/7,CNTR
EACH SUBCUTANEOUS
COMMUNITY
Start: 2024-02-01

## 2024-02-29 RX ORDER — INSULIN ASPART INJECTION 100 [IU]/ML
INJECTION, SOLUTION SUBCUTANEOUS
COMMUNITY
Start: 2024-01-17

## 2024-02-29 RX ORDER — PROCHLORPERAZINE 25 MG/1
SUPPOSITORY RECTAL
COMMUNITY
Start: 2023-12-16

## 2024-02-29 NOTE — PROGRESS NOTES
Chief Complaint   Patient presents with    [facility]     New pt    Abdominal Pain     X1 month, Right lower pain        HISTORY OF PRESENT ILLNESS  Sonia Lopez is a 33 year old female who presents for evaluation of RLQ pain. Dull pain started 1 mo ago and describes as intermittent in the RLQ and LLQ. Pain is worse in the evening after eating, but no specific trigger foods identified. Reports chronic constipation for entire life with bowel movements every 3 days, never feels that she empties completely. Has never treated constipation consistently. Last bowel movement yesterday. Has had nausea for the last week without vomiting, diarrhea, fevers, chills, blood or mucous in stool. LMP 2/15 with consistent menstruation.    Patient does feel that her bowels are worse when she is under stress. Feels that this has been worse lately. Was given rx for lexapro back in 2021 when anxiety was also a concern, but it does not sound like she ever took this. She has been working with therapy.      Current Outpatient Medications:     Continuous Blood Gluc Transmit (DEXCOM G6 TRANSMITTER) Does not apply Misc, , Disp: , Rfl:     Insulin Disposable Pump (OMNIPOD 5 G6 PODS, GEN 5,) Does not apply Misc, , Disp: , Rfl:     FIASP 100 UNIT/ML Injection Solution, , Disp: , Rfl:     Acetone, Urine, Test (KETOSTIX) In Vitro Strip, Use to check for ketones, Disp: , Rfl:     Glucagon 3 MG/DOSE Nasal Powder, Use 1 spray in 1 nostril once as needed for low blood sugar. If no response, may repeat in 15 minutes using a new intranasal device., Disp: , Rfl:     insulin glargine 100 UNIT/ML Subcutaneous Solution Pen-injector, Inject 22 units daily in the event of pump failure, Disp: , Rfl:     Insulin Aspart, w/Niacinamide, (FIASP) 100 UNIT/ML Subcutaneous Solution, As directed via pump, Disp: 10 mL, Rfl: 0    Vitamin D3, Cholecalciferol, 25 MCG (1000 UT) Oral Tab, Take 1 tablet (1,000 Units total) by mouth daily., Disp: , Rfl:      Acetaminophen-Caff-Pyrilamine (MIDOL MAX ST MENSTRUAL) 500-60-15 MG Oral Tab, Take 1 tablet by mouth as needed., Disp: , Rfl:     ibuprofen 200 MG Oral Tab, Take 2 tablets (400 mg total) by mouth every 6 (six) hours as needed for Pain., Disp: , Rfl:     Continuous Blood Gluc Sensor (DEXCOM G6 SENSOR) Does not apply Misc, , Disp: , Rfl:     levothyroxine 75 MCG Oral Tab, Take 1 tablet (75 mcg total) by mouth before breakfast., Disp: , Rfl:     Glucose Blood (FREESTYLE LITE TEST) In Vitro Strip, by Misc.(Non-Drug; Combo Route) route. Test blood sugar 5 times a day (omnipod pump), Disp: , Rfl:     Allergies: Penicillins and Penicillins    Patient Active Problem List   Diagnosis    Leukocytosis    Metabolic acidosis    Hyperglycemia    Diabetic ketoacidosis without coma associated with type 1 diabetes mellitus (HCC)    Hypothyroidism    CKD (chronic kidney disease) stage 2, GFR 60-89 ml/min    Microalbuminuria    Insulin pump in place    Microalbuminuria due to type 1 diabetes mellitus (HCC)    Uncontrolled type 1 diabetes mellitus with hyperglycemia (HCC)    Anxiety    Hypovitaminosis D    Type 1 diabetes mellitus (HCC)       Past Surgical History:   Procedure Laterality Date    Cataract      Retinal laser procedure         Social History     Socioeconomic History    Marital status:    Occupational History     Comment: /   Tobacco Use    Smoking status: Never    Smokeless tobacco: Never   Vaping Use    Vaping Use: Never used   Substance and Sexual Activity    Alcohol use: Yes     Alcohol/week: 1.0 standard drink of alcohol     Types: 1 Cans of beer per week     Comment: 1x weekly-wine, beer , mixed drink socially    Drug use: Never    Sexual activity: Yes     Partners: Male   Other Topics Concern    Caffeine Concern No    Exercise Yes    Seat Belt Yes    Special Diet No    Stress Concern Yes    Weight Concern No    Self-Exams No         Vitals:    02/29/24 0726   BP: 120/70   Pulse: 84    Resp: 16   Temp: 97.3 °F (36.3 °C)       PHYSICAL EXAM  GENERAL:  Alert and in no acute distress.  Well groomed and appropriately dressed.  CARDIOVASCULAR: Regular rate and rhythm.  PULMONOLOGY: Normal effort on room air. Clear to auscultation bilaterally.  ABDOMEN: Soft, nontender, nondistended.  No hepatosplenomegaly. No CVA tenderness.    ASSESSMENT/ PLAN  1. RLQ abdominal pain  2. Uncontrolled type 1 diabetes mellitus with hyperglycemia (HCC)  3. Chronic constipation  Suspect that symptoms are caused by constipation. Long standing history of diabetes, potential for diabetic gastroparesis which would worsen symptoms. Take Miralax once daily until symptoms improve and then initiate Metamucil/ psyllium fiber (aim for 25 g fiber per day) once daily.  Notified patient of side effects and the importance of hydration and increased fiber in diet. Patient to follow up if symptoms worsen or are not resolved in one week. Will likely order pelvic US if the pain persists at that time.      Patient expresses understanding and agreement with above plan.  George Sweeney PA-C

## 2024-03-01 ENCOUNTER — TELEPHONE (OUTPATIENT)
Dept: FAMILY MEDICINE CLINIC | Facility: CLINIC | Age: 34
End: 2024-03-01

## 2024-03-01 NOTE — TELEPHONE ENCOUNTER
Pt is calling because she saw Sweeney yesterday and was told to take Miralax and she's feeling worse today after taking it and wants to know if that's normal or not

## 2024-03-01 NOTE — TELEPHONE ENCOUNTER
Called and talked to patient, she had right sided discomfort but now after taking the miralax she is feeling discomfort on middle to left side of abd I told her that was normal as the stool was moving through the colon and to maintain hydration and the stool would work it's way out.

## 2024-03-07 ENCOUNTER — TELEPHONE (OUTPATIENT)
Dept: FAMILY MEDICINE CLINIC | Facility: CLINIC | Age: 34
End: 2024-03-07

## 2024-03-07 DIAGNOSIS — G89.29 CHRONIC RLQ PAIN: Primary | ICD-10-CM

## 2024-03-07 DIAGNOSIS — R10.31 CHRONIC RLQ PAIN: Primary | ICD-10-CM

## 2024-03-07 NOTE — TELEPHONE ENCOUNTER
Pt calling for update from last visit with vicente  I'm still have stomach pain can I take fiber,  Sweeney spoke to me about a ultrasound can she order the   Ultrasound to see what's going on. I'm still constipation.

## 2024-03-14 ENCOUNTER — TELEPHONE (OUTPATIENT)
Dept: FAMILY MEDICINE CLINIC | Facility: CLINIC | Age: 34
End: 2024-03-14

## 2024-03-14 ENCOUNTER — HOSPITAL ENCOUNTER (OUTPATIENT)
Dept: ULTRASOUND IMAGING | Age: 34
Discharge: HOME | End: 2024-03-14
Attending: PHYSICIAN ASSISTANT
Payer: COMMERCIAL

## 2024-03-14 DIAGNOSIS — N83.201 CYST OF RIGHT OVARY: Primary | ICD-10-CM

## 2024-03-14 DIAGNOSIS — G89.29 CHRONIC RLQ PAIN: ICD-10-CM

## 2024-03-14 DIAGNOSIS — R10.31 CHRONIC RLQ PAIN: ICD-10-CM

## 2024-03-14 PROCEDURE — 76830 TRANSVAGINAL US NON-OB: CPT | Performed by: PHYSICIAN ASSISTANT

## 2024-03-14 PROCEDURE — 76856 US EXAM PELVIC COMPLETE: CPT | Performed by: PHYSICIAN ASSISTANT

## 2024-03-15 NOTE — TELEPHONE ENCOUNTER
Impression   CONCLUSION:  6 cm cyst in the right ovary.  Arterial and venous flow to the right ovary is identified.  No sonographic evidence for ovarian torsion.  Recommend short-term follow-up ultrasound in 2-3 cycles to assess for resolution.     Marce Noe  CARLOS   [address] [address] [phone]   Called and talked to patient and went over the results and and she already has an appointment with a GYN doctor. For next month.

## 2024-04-22 ENCOUNTER — OFFICE VISIT (OUTPATIENT)
Facility: CLINIC | Age: 34
End: 2024-04-22
Payer: COMMERCIAL

## 2024-04-22 VITALS
HEIGHT: 66 IN | HEART RATE: 92 BPM | DIASTOLIC BLOOD PRESSURE: 72 MMHG | BODY MASS INDEX: 26.07 KG/M2 | WEIGHT: 162.19 LBS | SYSTOLIC BLOOD PRESSURE: 114 MMHG

## 2024-04-22 DIAGNOSIS — Z30.09 ENCOUNTER FOR COUNSELING REGARDING CONTRACEPTION: ICD-10-CM

## 2024-04-22 DIAGNOSIS — N83.201 CYST OF RIGHT OVARY: ICD-10-CM

## 2024-04-22 DIAGNOSIS — Z01.419 ENCOUNTER FOR ANNUAL ROUTINE GYNECOLOGICAL EXAMINATION: Primary | ICD-10-CM

## 2024-04-22 PROCEDURE — 87624 HPV HI-RISK TYP POOLED RSLT: CPT | Performed by: STUDENT IN AN ORGANIZED HEALTH CARE EDUCATION/TRAINING PROGRAM

## 2024-04-22 NOTE — PROGRESS NOTES
[facility]  Obstetrics and Gynecology   History & Physical    Chief complaint:   Chief Complaint   Patient presents with    Wellness Visit     New Patient - Annual Exam and Pap    Gyn Problem     Pain reports right lower abdominal pain on and off since 2024.        HPI: Sonia Lopez is a 33 year old  with Patient's last menstrual period was 2024 (exact date).      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 Will want kids eventually, not yet, working on improving diabetic control  Had pap 2018 but was inconclusive due to menstrual blood, never had repeat. That was her only pap  Has a lot of anxiety about pelvic exams - always painful. No pain with intercourse though    Hx Prior Abnormal Pap: No  Pap Date:  (patient reports her last one was about 5 years ago)  Pap Result Notes: Negative (per patient)    PMHx/Surghx reviewed, below. Of note: T1DM - per pt suboptimal control (last A1c 9.3 in chart, per pt had one ~8 recently) now working on control on insulin pump. Hx hypothyroid currently on levothyroxine  No abdominal surgeries  Famhx: no family hx breast/ovarian/uterine/colon cancers    PCP: Mattie Culver DO    Review of Systems:  Constitutional:  Denies fatigue, night sweats, hot flashes  Eyes:  denies blurred or double vision  Cardiovascular:  denies chest pain or palpitations  Respiratory:  denies shortness of  breath  Gastrointestinal:  denies heartburn, diarrhea or constipation  Genitourinary:  denies dysuria, incontinence, abnormal vaginal discharge, vaginal itching  Musculoskeletal:  denies back pain.  Skin/Breast:  Denies any breast pain, lumps, or discharge.   Neurological:  denies headaches, extremity weakness or numbness.  Psychiatric: denies depression or anxiety.  Endocrine:   denies excessive thirst or urination.  Heme/Lymph:  denies history of anemia, easy bruising or bleeding.    OB History:  OB History    Para Term  AB Living   0 0 0 0 0 0   SAB IAB Ectopic Multiple Live Births   0 0 0 0 0       Meds:  Current Outpatient Medications on File Prior to Visit   Medication Sig Dispense Refill    Continuous Blood Gluc Transmit (DEXCOM G6 TRANSMITTER) Does not apply Misc       Insulin Disposable Pump (OMNIPOD 5 G6 PODS, GEN 5,) Does not apply Misc       FIASP 100 UNIT/ML Injection Solution       Acetone, Urine, Test (KETOSTIX) In Vitro Strip Use to check for ketones      Glucagon 3 MG/DOSE Nasal Powder Use 1 spray in 1 nostril once as needed for low blood sugar. If no response, may repeat in 15 minutes using a new intranasal device.      insulin glargine 100 UNIT/ML Subcutaneous Solution Pen-injector Inject 22 units daily in the event of pump failure      Insulin Aspart, w/Niacinamide, (FIASP) 100 UNIT/ML Subcutaneous Solution As directed via pump 10 mL 0    Vitamin D3, Cholecalciferol, 25 MCG (1000 UT) Oral Tab Take 1 tablet (1,000 Units total) by mouth daily. (Patient not taking: Reported on 2024)      Acetaminophen-Caff-Pyrilamine (MIDOL MAX ST MENSTRUAL) 500-60-15 MG Oral Tab Take 1 tablet by mouth as needed.      ibuprofen 200 MG Oral Tab Take 2 tablets (400 mg total) by mouth every 6 (six) hours as needed for Pain.      Continuous Blood Gluc Sensor (DEXCOM G6 SENSOR) Does not apply Misc       levothyroxine 75 MCG Oral Tab Take 1 tablet (75 mcg total) by mouth before breakfast.      Glucose  Blood (FREESTYLE LITE TEST) In Vitro Strip by Misc.(Non-Drug; Combo Route) route. Test blood sugar 5 times a day (omnipod pump)       No current facility-administered medications on file prior to visit.       All:  Allergies   Allergen Reactions    Penicillins HIVES    Penicillins UNKNOWN       PMH:  Past Medical History:    Anxiety    Diabetes mellitus (HCC)    Diabetic retinopathy (HCC)    Hypothyroidism    Thyroid disease    Type 1 diabetes mellitus (HCC)       PSH:  Past Surgical History:   Procedure Laterality Date    Cataract      Retinal laser procedure         Social History:  Social History     Socioeconomic History    Marital status:      Spouse name: Not on file    Number of children: Not on file    Years of education: Not on file    Highest education level: Not on file   Occupational History     Comment: /   Tobacco Use    Smoking status: Never    Smokeless tobacco: Never   Vaping Use    Vaping status: Never Used   Substance and Sexual Activity    Alcohol use: Yes     Alcohol/week: 1.0 standard drink of alcohol     Types: 1 Cans of beer per week     Comment: 1x weekly-wine, beer , mixed drink socially    Drug use: Never    Sexual activity: Yes     Partners: Male     Comment: None   Other Topics Concern    Caffeine Concern No    Exercise Yes    Seat Belt Yes    Special Diet No    Stress Concern Yes    Weight Concern No     Service Not Asked    Blood Transfusions Not Asked    Occupational Exposure Not Asked    Hobby Hazards Not Asked    Sleep Concern Not Asked    Back Care Not Asked    Bike Helmet Not Asked    Self-Exams No   Social History Narrative    Not on file     Social Determinants of Health     Financial Resource Strain: Low Risk  (10/28/2021)    [facility]    Overall Financial Resource Strain ([facility])     Difficulty of Paying Living Expenses: Not very hard   Food Insecurity: Not on file   Transportation Needs:  No Transportation Needs (10/28/2021)    [facility]    PRAPARE - Transportation     Lack of Transportation (Medical): No     Lack of Transportation (Non-Medical): No   Physical Activity: Not on file   Stress: Not on file   Social Connections: Not on file   Housing Stability: Not on file        Family History:  Family History   Problem Relation Age of Onset    Asthma Mother     Crohn's Disease Brother     Heart Disease Maternal Grandmother     Stroke Maternal Grandmother     Stroke Maternal Grandfather        PHYSICAL EXAM:     Vitals:    24 0726   BP: 114/72   Pulse: 92   Weight: 162 lb 3.2 oz (73.6 kg)   Height: 66\"       Body mass index is 26.18 kg/m².      Constitutional: well developed, well nourished  Head/Face: normocephalic  Neck/Thyroid: thyroid symmetric, no thyromegaly, no nodules, no adenopathy  Lymphatic: no abnormal supraclavicular or axillary adenopathy is noted  Breast: normal without palpable masses, tenderness, asymmetry, nipple discharge, nipple retraction or skin changes  Abdomen:  soft, nontender, nondistended, no masses  Skin/Hair: no unusual rashes or bruises  Extremities: no edema, no cyanosis  Psychiatric:  Oriented to time, place, person and situation. Appropriate mood and affect    Pelvic Exam:  External Genitalia: normal appearance, hair distribution, and no lesions  Urethral Meatus:  normal in size, location, without lesions and prolapse  Bladder:  No fullness, masses or tenderness  Vagina:  Normal appearance without lesions, no abnormal discharge. Small amt menstrual blood  Cervix:  Normal without tenderness on motion  Partially visualized (exam limited by pt discomfort) - no clear lesions  Uterus: normal in size, contour, position, mobility, without tenderness  Adnexa: normal without masses or tenderness  Perineum: normal  Anus: no hemorrhoids     Assessment & Plan:     Sonia Lopez is a 33 year old      Diagnoses and all orders  for this visit:    Encounter for annual routine gynecological examination  -     ThinPrep PAP Smear; Future  -     Hpv Dna  High Risk , Thin Prep Collect; Future    Cyst of right ovary  -     US PELVIS W EV (CPT=76856/28256); Future    Encounter for counseling regarding contraception       Normal breast and pelvic exam - no obvious R adnexal fullness (exam limited by pt discomfort with pelvic exam)    Counseled re risk for ovarian torsion with R ovarian cyst measuring up to 6cm. Discussed typical management would be laparoscopic ovarian cystectomy however pt now has had considerable improvement in her pain so would recommend repeating US to confirm cyst has not resolved prior to surgery. Discussed laparoscopic R ovarian cystectomy - discussed procedure in depth    Pt not planning on pregnancy anytime soon (at least until glycemic control improved) - offered OCP rx, pt will consider. Wouldn't want implant or IUD    Healthcare maintenance:  Pap: done   HPV vaccine: unsure if received, considering, will check with mom if received as teen  Contraception: above  Mammogram, age 40          Charlotte Johns MD  [facility] - OBGYN

## 2024-04-23 LAB — HPV I/H RISK 1 DNA SPEC QL NAA+PROBE: NEGATIVE

## 2024-04-26 LAB
.: NORMAL
.: NORMAL

## 2024-05-02 ENCOUNTER — HOSPITAL ENCOUNTER (OUTPATIENT)
Dept: ULTRASOUND IMAGING | Age: 34
Discharge: HOME | End: 2024-05-02
Attending: STUDENT IN AN ORGANIZED HEALTH CARE EDUCATION/TRAINING PROGRAM
Payer: COMMERCIAL

## 2024-05-02 DIAGNOSIS — N83.201 CYST OF RIGHT OVARY: ICD-10-CM

## 2024-05-02 PROCEDURE — 76856 US EXAM PELVIC COMPLETE: CPT | Performed by: STUDENT IN AN ORGANIZED HEALTH CARE EDUCATION/TRAINING PROGRAM

## 2024-05-02 PROCEDURE — 93975 VASCULAR STUDY: CPT | Performed by: STUDENT IN AN ORGANIZED HEALTH CARE EDUCATION/TRAINING PROGRAM

## 2024-11-12 ENCOUNTER — TELEPHONE (OUTPATIENT)
Dept: FAMILY MEDICINE CLINIC | Facility: CLINIC | Age: 34
End: 2024-11-12

## 2025-05-30 ENCOUNTER — OFFICE VISIT (OUTPATIENT)
Dept: FAMILY MEDICINE CLINIC | Facility: CLINIC | Age: 35
End: 2025-05-30
Payer: COMMERCIAL

## 2025-05-30 VITALS
HEIGHT: 66 IN | WEIGHT: 163 LBS | OXYGEN SATURATION: 98 % | DIASTOLIC BLOOD PRESSURE: 74 MMHG | BODY MASS INDEX: 26.2 KG/M2 | HEART RATE: 76 BPM | SYSTOLIC BLOOD PRESSURE: 116 MMHG

## 2025-05-30 DIAGNOSIS — J01.40 ACUTE NON-RECURRENT PANSINUSITIS: ICD-10-CM

## 2025-05-30 DIAGNOSIS — J02.9 SORE THROAT: Primary | ICD-10-CM

## 2025-05-30 LAB
CONTROL LINE PRESENT WITH A CLEAR BACKGROUND (YES/NO): YES YES/NO
KIT LOT #: NORMAL NUMERIC
STREP GRP A CUL-SCR: NEGATIVE

## 2025-05-30 PROCEDURE — 87880 STREP A ASSAY W/OPTIC: CPT | Performed by: PHYSICIAN ASSISTANT

## 2025-05-30 PROCEDURE — 99213 OFFICE O/P EST LOW 20 MIN: CPT | Performed by: PHYSICIAN ASSISTANT

## 2025-05-30 PROCEDURE — 3008F BODY MASS INDEX DOCD: CPT | Performed by: PHYSICIAN ASSISTANT

## 2025-05-30 PROCEDURE — 3078F DIAST BP <80 MM HG: CPT | Performed by: PHYSICIAN ASSISTANT

## 2025-05-30 PROCEDURE — 3074F SYST BP LT 130 MM HG: CPT | Performed by: PHYSICIAN ASSISTANT

## 2025-05-30 RX ORDER — ESCITALOPRAM OXALATE 10 MG
TABLET ORAL
COMMUNITY
Start: 2024-12-18

## 2025-05-30 RX ORDER — DOXYCYCLINE 25 MG/5ML
100 POWDER, FOR SUSPENSION ORAL EVERY 12 HOURS
Qty: 280 ML | Refills: 0 | Status: SHIPPED | OUTPATIENT
Start: 2025-05-30 | End: 2025-06-06

## 2025-05-31 NOTE — PROGRESS NOTES
.Subjective:   Sonia Lopez is a 34 year old female who presents for Upper Respiratory Infection (Cold symptoms sore throat on and off and cough for past week/Advil and cough drops that helps )       History/Other:   History of Present Illness  Sonia Lopez is a 34 year old female who presents with a persistent cough and sore throat.    She developed a sore throat and cough May 21. Initially, she thought it was a cold caught from a friend who later developed an ear infection. Her symptoms began with a sore throat and progressed to a 'chesty cough.'    Over the past week, she has been taking Advil every six hours for head and throat pain, but has not needed it for the last three days as those symptoms have improved. However, she continues to experience a lingering cough, which sometimes leads to 'coughing attacks' where she struggles to breathe. She also feels nasally and has a sensation of fullness in her ears, which she describes as needing to 'pop.' She estimates her overall improvement at about 30-50% and has been able to return to work, although she is concerned about her coughing alarming her colleagues.    She has a history of her symptoms starting in the throat and then developing into a lingering cough when she gets a cold.     Regarding her diabetes management, her blood sugars remain stable unless she experiences a 'throwing up illness.' Fevers can sometimes affect her blood sugars, but even during a COVID-19 infection, her levels were not significantly impacted. She does not usually eat breakfast and typically feels hungry around 11 or 12.   Chief Complaint Reviewed and Verified  Nursing Notes Reviewed and   Verified  Tobacco Reviewed  Allergies Reviewed  Medications Reviewed    Problem List Reviewed  Medical History Reviewed  Surgical History   Reviewed  Family History Reviewed  Social History Reviewed         Tobacco:  She has never smoked tobacco.    Current Medications[1]        Objective:    /74   Pulse 76   Ht 5' 6\" (1.676 m)   Wt 163 lb (73.9 kg)   SpO2 98%   BMI 26.31 kg/m²  Estimated body mass index is 26.31 kg/m² as calculated from the following:    Height as of this encounter: 5' 6\" (1.676 m).    Weight as of this encounter: 163 lb (73.9 kg).  Results  LABS  Rapid strep swab: Negative (05/30/2025)     Physical Exam  GENERAL: Alert, cooperative, well developed, no acute distress.  HEENT: Normocephalic, normal oropharynx, moist mucous membranes, nasal passages normal.  CHEST: Clear to auscultation bilaterally, no wheezes, rhonchi, or crackles.  CARDIOVASCULAR: Normal heart rate and rhythm, S1 and S2 normal without murmurs.      Assessment & Plan:   1. Sore throat (Primary)  -     Strep A Assay W/Optic  Other orders  -     Doxycycline Monohydrate; Take 20 mL (100 mg total) by mouth every 12 (twelve) hours for 7 days.  Dispense: 280 mL; Refill: 0    Assessment & Plan  Acute  pansinusitis  Symptoms suggest a viral URI possibly turned bacterial sinusitis with sore throat, nasal congestion, and cough, following exposure to a friend with a cold. Symptoms have persisted for over a week, with the cough progressing from throat-based to chesty, accompanied by occasional coughing attacks. Nasal congestion and ear fullness indicate possible postnasal drip. Condition is improving by 30-50%.  - Prescribe doxycycline in liquid form if symptoms do not improve, to be started before travel if necessary, and advise to avoid dairy when taking doxycycline.  - Recommend nasal saline and consider an allergy pill like Claritin to reduce postnasal drip.  - Advise against using Mucinex as per endocrinologist's recommendation.  - Discuss Afrin nasal spray for potential use before flight if congestion persists, with caution about rebound symptoms.          OUSMANE Lomas, 5/30/2025, 10:02 PM             [1]   Current Outpatient Medications   Medication Sig Dispense Refill    LEXAPRO 10 MG Oral Tab        doxycycline 25 MG/5ML Oral Recon Susp Take 20 mL (100 mg total) by mouth every 12 (twelve) hours for 7 days. 280 mL 0    Continuous Blood Gluc Transmit (DEXCOM G6 TRANSMITTER) Does not apply Misc       Insulin Disposable Pump (OMNIPOD 5 G6 PODS, GEN 5,) Does not apply Misc       FIASP 100 UNIT/ML Injection Solution       Acetone, Urine, Test (KETOSTIX) In Vitro Strip Use to check for ketones      Glucagon 3 MG/DOSE Nasal Powder Use 1 spray in 1 nostril once as needed for low blood sugar. If no response, may repeat in 15 minutes using a new intranasal device.      insulin glargine 100 UNIT/ML Subcutaneous Solution Pen-injector Inject 22 units daily in the event of pump failure      Acetaminophen-Caff-Pyrilamine (MIDOL MAX ST MENSTRUAL) 500-60-15 MG Oral Tab Take 1 tablet by mouth as needed.      ibuprofen 200 MG Oral Tab Take 2 tablets (400 mg total) by mouth every 6 (six) hours as needed for Pain.      Continuous Blood Gluc Sensor (DEXCOM G6 SENSOR) Does not apply Misc       levothyroxine 75 MCG Oral Tab Take 1 tablet (75 mcg total) by mouth before breakfast.      Glucose Blood (FREESTYLE LITE TEST) In Vitro Strip by Misc.(Non-Drug; Combo Route) route. Test blood sugar 5 times a day (omnipod pump)      Insulin Aspart, w/Niacinamide, (FIASP) 100 UNIT/ML Subcutaneous Solution As directed via pump 10 mL 0    Vitamin D3, Cholecalciferol, 25 MCG (1000 UT) Oral Tab Take 1 tablet (1,000 Units total) by mouth daily. (Patient not taking: Reported on 4/22/2024)

## 2025-06-24 ENCOUNTER — PATIENT OUTREACH (OUTPATIENT)
Dept: FAMILY MEDICINE CLINIC | Facility: CLINIC | Age: 35
End: 2025-06-24

## (undated) NOTE — LETTER
Your patient was recently seen at the [facility] for a hospital follow-up visit. The visit note is attached. Please contact the clinic with any questions at [phone].     Thank you,  ELIEZER Frey